# Patient Record
Sex: FEMALE | Race: WHITE | NOT HISPANIC OR LATINO | URBAN - NONMETROPOLITAN AREA
[De-identification: names, ages, dates, MRNs, and addresses within clinical notes are randomized per-mention and may not be internally consistent; named-entity substitution may affect disease eponyms.]

---

## 2019-04-15 ENCOUNTER — OFFICE VISIT (OUTPATIENT)
Dept: INTERNAL MEDICINE | Facility: CLINIC | Age: 19
End: 2019-04-15

## 2019-04-15 VITALS
HEART RATE: 88 BPM | TEMPERATURE: 99 F | OXYGEN SATURATION: 100 % | SYSTOLIC BLOOD PRESSURE: 141 MMHG | RESPIRATION RATE: 18 BRPM | DIASTOLIC BLOOD PRESSURE: 84 MMHG | WEIGHT: 191 LBS

## 2019-04-15 DIAGNOSIS — R53.83 FATIGUE, UNSPECIFIED TYPE: ICD-10-CM

## 2019-04-15 DIAGNOSIS — Z00.00 ANNUAL PHYSICAL EXAM: Primary | ICD-10-CM

## 2019-04-15 DIAGNOSIS — F41.8 DEPRESSION WITH ANXIETY: ICD-10-CM

## 2019-04-15 PROCEDURE — 99385 PREV VISIT NEW AGE 18-39: CPT | Performed by: PHYSICIAN ASSISTANT

## 2019-04-15 RX ORDER — NORGESTIMATE AND ETHINYL ESTRADIOL 7DAYSX3 28
1 KIT ORAL DAILY
COMMUNITY

## 2019-04-15 RX ORDER — FLUOXETINE HYDROCHLORIDE 20 MG/1
20 CAPSULE ORAL DAILY
Qty: 30 CAPSULE | Refills: 5 | Status: SHIPPED | OUTPATIENT
Start: 2019-04-15 | End: 2019-10-18

## 2019-04-15 NOTE — PROGRESS NOTES
Subjective:    Chief Complaint   Patient presents with   • Establish Care         History of Present Illness   Diamante Garay is a 18 y.o. female here for her yearly physical exam. She is a college student and works part time. Not currently exercising. Tries to eat a well balanced diet.    Main concern today is depression and anxiety. She is here today with a friend. Patient states her grandmother passed away a few months ago, and since then she's been tearful most days. She's not wanting to go out and do what she used to. She is more anxious regarding her classes. Not sleeping as well. She does have a good support system. Denies any SI, HI. She complains of feeling tired all of the time, even with a good night's sleep. She denies any past issue with depression. Mother has been diagnosed with bipolar disorder.    She is on an OCP. Last pap in 2018, normal. Periods are regular.    History:  Past Medical History:   Diagnosis Date   • Asthma         History:  LMP: No LMP recorded.  Last pap date: 2018   Abnormal pap? no      Do you take any herbs or supplements that were not prescribed by a doctor? no  Are you taking calcium supplements? N/A  Are you taking aspirin daily? N/A    No Known Allergies    Past Surgical History:   Procedure Laterality Date   • TONSILLECTOMY         Social History     Tobacco Use   • Smoking status: Never Smoker   • Smokeless tobacco: Never Used   Substance Use Topics   • Alcohol use: No     Frequency: Never       Family History   Problem Relation Age of Onset   • Mental illness Mother    • Cancer Maternal Grandmother    • Diabetes Maternal Grandmother    • Hypertension Maternal Grandmother    • Hyperlipidemia Maternal Grandmother    • Cancer Maternal Grandfather    • Heart attack Maternal Grandfather    • Migraines Maternal Grandfather          Review of Systems   Constitutional: Positive for fatigue. Negative for appetite change, chills, fever and unexpected weight change.   HENT: Negative  for congestion, ear pain, hearing loss, nosebleeds, sinus pressure, sore throat, tinnitus and trouble swallowing.    Eyes: Negative for pain, discharge, redness, itching and visual disturbance.   Respiratory: Negative for cough, chest tightness, shortness of breath and wheezing.    Cardiovascular: Negative for chest pain, palpitations and leg swelling.   Gastrointestinal: Negative for abdominal pain, blood in stool, constipation, diarrhea, nausea and vomiting.   Endocrine: Negative for cold intolerance, heat intolerance, polydipsia, polyphagia and polyuria.   Genitourinary: Negative for decreased urine volume, dysuria, flank pain, frequency and hematuria.   Musculoskeletal: Negative for arthralgias, back pain, gait problem, joint swelling, myalgias, neck pain and neck stiffness.   Skin: Negative for color change and rash.   Allergic/Immunologic: Negative for environmental allergies, food allergies and immunocompromised state.   Neurological: Negative for dizziness, syncope, weakness, light-headedness and headaches.   Hematological: Negative for adenopathy. Does not bruise/bleed easily.   Psychiatric/Behavioral: Positive for decreased concentration, dysphoric mood and sleep disturbance. Negative for suicidal ideas. The patient is nervous/anxious.        Do you have pain that bothers you in your daily life? no      Objective:    Vitals:    04/15/19 1559   BP: 141/84   Pulse: 88   Resp: 18   Temp: 99 °F (37.2 °C)   TempSrc: Temporal   SpO2: 100%   Weight: 86.6 kg (191 lb)       Physical Exam   Constitutional: Appears well-developed and well-nourished.   HENT:   Right Ear: Tympanic membrane and external ear normal.   Left Ear: Tympanic membrane and external ear normal.   Nose: Nose normal.   Mouth/Throat: Oropharynx is clear and moist.   Eyes: EOM are normal. Pupils are equal, round, and reactive to light.   Neck: Normal range of motion. Neck supple.   Cardiovascular: Normal rate, regular rhythm and normal heart sounds.     Pulmonary/Chest: Effort normal and breath sounds normal.   Abdominal: Soft. Bowel sounds are normal. There is no CVA tenderness.   Musculoskeletal: Normal range of motion.   Lymphadenopathy: No cervical adenopathy noted.   Neurological: Alert and oriented to person, place, and time.   Skin: Skin is warm and dry.   Psychiatric: Exhibits a normal mood and affect.     Assessment and Plan:     Diamante was seen today for establish care.    Diagnoses and all orders for this visit:    Annual physical exam    Fatigue, unspecified type  -     TSH  -     T4, Free  -     Comprehensive Metabolic Panel  -     CBC No Differential  -     Vitamin B12    Depression with anxiety  -     TSH  -     T4, Free  -     FLUoxetine (PROZAC) 20 MG capsule; Take 1 capsule by mouth Daily.      Discussed risks, benefits, SE of SSRIs.     Patient Counseling:  -Nutrition: Stressed importance of moderation in sodium/caffeine intake, saturated fat and cholesterol, caloric balance, sufficient intake of fresh fruits, vegetables, fiber, calcium, iron, and 1 g folate supplementation if of childbearing age.   -Exercise: Stressed the importance of regular exercise.  -Substance Abuse: Discussed cessation/primary prevention of tobacco (if applicable), alcohol, or other drug use (if applicable); driving or other dangerous activities under the influence; availability of treatment for abuse.   -Injury prevention: Discussed safety belts, safety helmets, smoke detector, smoking near bedding or upholstery.   -Dental health: Discussed importance of regular tooth brushing, flossing, and dental visits.  -Immunizations reviewed.  -Discussed benefits of screening colonoscopy (if applicable).  -After hours service discussed with patient.    Discussed the patient's BMI with her. The BMI is above average; BMI management plan is completed    Follow up in 4 weeks- depression    Chely Rodriguez PA-C  04/15/2019    Please note that portions of this note were completed with chau  voice recognition program. Efforts were made to edit dictation, but occasionally words are mistranscribed.

## 2019-04-20 PROBLEM — F41.8 DEPRESSION WITH ANXIETY: Status: ACTIVE | Noted: 2019-04-20

## 2019-10-18 ENCOUNTER — OFFICE VISIT (OUTPATIENT)
Dept: INTERNAL MEDICINE | Facility: CLINIC | Age: 19
End: 2019-10-18

## 2019-10-18 VITALS
BODY MASS INDEX: 32.49 KG/M2 | WEIGHT: 195 LBS | HEIGHT: 65 IN | SYSTOLIC BLOOD PRESSURE: 135 MMHG | DIASTOLIC BLOOD PRESSURE: 84 MMHG | OXYGEN SATURATION: 99 % | TEMPERATURE: 97.6 F | HEART RATE: 75 BPM

## 2019-10-18 DIAGNOSIS — F41.9 ANXIETY: ICD-10-CM

## 2019-10-18 DIAGNOSIS — Z23 NEED FOR INFLUENZA VACCINATION: ICD-10-CM

## 2019-10-18 DIAGNOSIS — G43.109 MIGRAINE WITH AURA AND WITHOUT STATUS MIGRAINOSUS, NOT INTRACTABLE: Primary | ICD-10-CM

## 2019-10-18 PROCEDURE — 90471 IMMUNIZATION ADMIN: CPT | Performed by: FAMILY MEDICINE

## 2019-10-18 PROCEDURE — 99214 OFFICE O/P EST MOD 30 MIN: CPT | Performed by: FAMILY MEDICINE

## 2019-10-18 PROCEDURE — 90674 CCIIV4 VAC NO PRSV 0.5 ML IM: CPT | Performed by: FAMILY MEDICINE

## 2019-10-18 RX ORDER — PROPRANOLOL HCL 60 MG
60 CAPSULE, EXTENDED RELEASE 24HR ORAL DAILY
Qty: 30 CAPSULE | Refills: 2 | Status: SHIPPED | OUTPATIENT
Start: 2019-10-18 | End: 2020-01-13

## 2019-10-18 NOTE — PROGRESS NOTES
"Diamante Garay is a 19 y.o. female.    Chief Complaint   Patient presents with   • Follow-up     migraines worsened while taking Prozac; patient dc'd medication and symptoms have improved, but are not gone       HPI   Patient presents today to f/u on anxiety and depression.  She reports prozac helped, but did make migraines.  Patient is overwhelmed with stress from work and school.  She admits to nervousness and worry.      She reports migraines with aura as well.  She admits to nausea on occasion.  She does admit to otosensitivity and photosensivity. She has 1-2 migraines a week. She reports she has to take excedrin to get relief.      The following portions of the patient's history were reviewed and updated as appropriate: allergies, current medications, past family history, past medical history, past social history, past surgical history and problem list.     No Known Allergies      Current Outpatient Medications:   •  norgestimate-ethinyl estradiol (TRI-SPRINTEC) 0.18/0.215/0.25 MG-35 MCG per tablet, Take 1 tablet by mouth Daily., Disp: , Rfl:   •  propranolol LA (INDERAL LA) 60 MG 24 hr capsule, Take 1 capsule by mouth Daily., Disp: 30 capsule, Rfl: 2    ROS    Review of Systems   Constitutional: Negative for chills, fatigue and fever.   HENT: Negative for congestion, postnasal drip and sore throat.    Respiratory: Negative for cough and shortness of breath.    Cardiovascular: Negative for chest pain.   Gastrointestinal: Negative for abdominal pain, constipation, diarrhea, nausea and vomiting.   Musculoskeletal: Negative for arthralgias and back pain.   Neurological: Positive for headache. Negative for weakness and numbness.   Psychiatric/Behavioral: Positive for stress. Negative for depressed mood. The patient is nervous/anxious.        Vitals:    10/18/19 0821   BP: 135/84   Pulse: 75   Temp: 97.6 °F (36.4 °C)   SpO2: 99%   Weight: 88.5 kg (195 lb)   Height: 165.1 cm (65\")     Body mass index is 32.45 " kg/m².    Physical Exam     Physical Exam   Constitutional: She is oriented to person, place, and time. She appears well-developed and well-nourished. No distress.   HENT:   Head: Normocephalic and atraumatic.   Right Ear: External ear normal.   Left Ear: External ear normal.   Eyes: Conjunctivae and EOM are normal.   Cardiovascular: Normal rate and regular rhythm.   No murmur heard.  Pulmonary/Chest: Effort normal and breath sounds normal. No respiratory distress. She has no wheezes.   Abdominal: Soft. Bowel sounds are normal. She exhibits no distension. There is no tenderness.   Musculoskeletal: She exhibits no edema.   Neurological: She is alert and oriented to person, place, and time. No cranial nerve deficit.   Skin: Skin is warm and dry.   Psychiatric: She has a normal mood and affect. Her behavior is normal.       Assessment/Plan    Problem List Items Addressed This Visit        Cardiovascular and Mediastinum    Migraine with aura and without status migrainosus, not intractable - Primary     Uncontrolled.  Patient having more than 4 migraines a month.  We will start the patient on propranolol to help for anxiety and migraine prevention.  She may continue taking Excedrin as needed.             Relevant Medications    propranolol LA (INDERAL LA) 60 MG 24 hr capsule       Other    Anxiety     Uncontrolled without medication.  Patient unable to take Prozac.  Will start the patient on propranolol.  Discussed potential side effects with medication.           Other Visit Diagnoses     Need for influenza vaccination        Relevant Orders    Flucelvax Quad=>4Years ([id number]) (Completed)          New Medications Ordered This Visit   Medications   • propranolol LA (INDERAL LA) 60 MG 24 hr capsule     Sig: Take 1 capsule by mouth Daily.     Dispense:  30 capsule     Refill:  2       Orders Placed This Encounter   Procedures   • Flucelvax Quad=>4Years ([id number])       Return in about 1 month (around 11/18/2019) for  anxiety and migraines.    Soraya Mai, DO

## 2019-10-18 NOTE — ASSESSMENT & PLAN NOTE
Uncontrolled.  Patient having more than 4 migraines a month.  We will start the patient on propranolol to help for anxiety and migraine prevention.  She may continue taking Excedrin as needed.

## 2019-10-18 NOTE — ASSESSMENT & PLAN NOTE
Uncontrolled without medication.  Patient unable to take Prozac.  Will start the patient on propranolol.  Discussed potential side effects with medication.

## 2020-01-13 RX ORDER — PROPRANOLOL HCL 60 MG
CAPSULE, EXTENDED RELEASE 24HR ORAL
Qty: 90 CAPSULE | Refills: 0 | Status: SHIPPED | OUTPATIENT
Start: 2020-01-13 | End: 2020-04-07

## 2020-04-07 RX ORDER — PROPRANOLOL HCL 60 MG
CAPSULE, EXTENDED RELEASE 24HR ORAL
Qty: 90 CAPSULE | Refills: 0 | Status: SHIPPED | OUTPATIENT
Start: 2020-04-07 | End: 2020-07-02

## 2020-07-02 RX ORDER — PROPRANOLOL HCL 60 MG
CAPSULE, EXTENDED RELEASE 24HR ORAL
Qty: 30 CAPSULE | Refills: 0 | Status: SHIPPED | OUTPATIENT
Start: 2020-07-02 | End: 2020-07-31

## 2020-07-31 RX ORDER — PROPRANOLOL HCL 60 MG
CAPSULE, EXTENDED RELEASE 24HR ORAL
Qty: 30 CAPSULE | Refills: 0 | Status: SHIPPED | OUTPATIENT
Start: 2020-07-31 | End: 2020-08-24

## 2020-08-24 RX ORDER — PROPRANOLOL HCL 60 MG
CAPSULE, EXTENDED RELEASE 24HR ORAL
Qty: 30 CAPSULE | Refills: 0 | Status: SHIPPED | OUTPATIENT
Start: 2020-08-24 | End: 2020-09-19

## 2020-09-19 RX ORDER — PROPRANOLOL HCL 60 MG
CAPSULE, EXTENDED RELEASE 24HR ORAL
Qty: 30 CAPSULE | Refills: 0 | Status: SHIPPED | OUTPATIENT
Start: 2020-09-19 | End: 2020-09-25 | Stop reason: DRUGHIGH

## 2020-09-25 ENCOUNTER — OFFICE VISIT (OUTPATIENT)
Dept: INTERNAL MEDICINE | Facility: CLINIC | Age: 20
End: 2020-09-25

## 2020-09-25 VITALS
DIASTOLIC BLOOD PRESSURE: 82 MMHG | TEMPERATURE: 98 F | HEIGHT: 65 IN | HEART RATE: 98 BPM | BODY MASS INDEX: 36.79 KG/M2 | WEIGHT: 220.8 LBS | OXYGEN SATURATION: 98 % | SYSTOLIC BLOOD PRESSURE: 130 MMHG

## 2020-09-25 DIAGNOSIS — G43.109 MIGRAINE WITH AURA AND WITHOUT STATUS MIGRAINOSUS, NOT INTRACTABLE: Primary | ICD-10-CM

## 2020-09-25 DIAGNOSIS — F32.0 CURRENT MILD EPISODE OF MAJOR DEPRESSIVE DISORDER WITHOUT PRIOR EPISODE (HCC): ICD-10-CM

## 2020-09-25 DIAGNOSIS — F41.9 ANXIETY: ICD-10-CM

## 2020-09-25 PROCEDURE — 99214 OFFICE O/P EST MOD 30 MIN: CPT | Performed by: FAMILY MEDICINE

## 2020-09-25 RX ORDER — PROPRANOLOL HYDROCHLORIDE 80 MG/1
80 CAPSULE, EXTENDED RELEASE ORAL DAILY
Qty: 30 CAPSULE | Refills: 5 | Status: SHIPPED | OUTPATIENT
Start: 2020-09-25 | End: 2021-01-20

## 2020-09-25 RX ORDER — BUPROPION HYDROCHLORIDE 150 MG/1
150 TABLET ORAL DAILY
Qty: 30 TABLET | Refills: 5 | Status: SHIPPED | OUTPATIENT
Start: 2020-09-25 | End: 2021-03-10

## 2020-09-25 NOTE — PROGRESS NOTES
Diamante Garay is a 19 y.o. female.    Chief Complaint   Patient presents with   • Depression     states she has been having issues for several months but feels like it's gotten worse. Would like to discuss Wellbutrin       HPI   Patient presents today with worsening anxiety and now symptoms of depression.  She cannot shut her mind off.  She reports nervousness, worry, difficulty sleeping.  Denies any feelings of hopelessness or worthlessness.  She does feel down and has crying spells.  She reports increased stress with school and work.  She has been taking propranolol for migraines and anxiety.  However, migraines have become more frequent as well.  Migraines are 2 times a week. She is sensitive to light and sound.  Not much nausea.  She takes excedrin with good response.  She has gained weight as well.      The following portions of the patient's history were reviewed and updated as appropriate: allergies, current medications, past family history, past medical history, past social history, past surgical history and problem list.     No Known Allergies      Current Outpatient Medications:   •  norgestimate-ethinyl estradiol (TRI-SPRINTEC) 0.18/0.215/0.25 MG-35 MCG per tablet, Take 1 tablet by mouth Daily., Disp: , Rfl:   •  buPROPion XL (Wellbutrin XL) 150 MG 24 hr tablet, Take 1 tablet by mouth Daily., Disp: 30 tablet, Rfl: 5  •  propranolol LA (Inderal LA) 80 MG 24 hr capsule, Take 1 capsule by mouth Daily., Disp: 30 capsule, Rfl: 5    ROS    Review of Systems   Constitutional: Positive for fatigue. Negative for chills and fever.   HENT: Negative for congestion, postnasal drip and rhinorrhea.    Eyes: Negative for visual disturbance.   Respiratory: Negative for cough, shortness of breath and wheezing.    Cardiovascular: Negative for chest pain and leg swelling.   Gastrointestinal: Positive for diarrhea. Negative for abdominal pain, constipation, nausea and vomiting.   Allergic/Immunologic: Negative for  "environmental allergies.   Neurological: Positive for headache. Negative for weakness and numbness.   Hematological: Does not bruise/bleed easily.   Psychiatric/Behavioral: Positive for sleep disturbance and depressed mood. The patient is nervous/anxious.        Vitals:    09/25/20 1634   BP: 130/82   Pulse: 98   Temp: 98 °F (36.7 °C)   SpO2: 98%   Weight: 100 kg (220 lb 12.8 oz)   Height: 165.1 cm (65\")     Body mass index is 36.74 kg/m².    Physical Exam     Physical Exam  Constitutional:       General: She is not in acute distress.     Appearance: She is well-developed.   HENT:      Head: Normocephalic and atraumatic.      Right Ear: External ear normal.      Left Ear: External ear normal.   Eyes:      Extraocular Movements: Extraocular movements intact.      Conjunctiva/sclera: Conjunctivae normal.   Neck:      Musculoskeletal: Normal range of motion and neck supple.   Cardiovascular:      Rate and Rhythm: Normal rate and regular rhythm.      Heart sounds: No murmur.   Pulmonary:      Effort: Pulmonary effort is normal. No respiratory distress.      Breath sounds: Normal breath sounds. No wheezing.   Abdominal:      General: Bowel sounds are normal. There is no distension.      Palpations: Abdomen is soft.      Tenderness: There is no abdominal tenderness.   Lymphadenopathy:      Cervical: No cervical adenopathy.   Skin:     General: Skin is warm and dry.   Neurological:      Mental Status: She is alert and oriented to person, place, and time.      Cranial Nerves: No cranial nerve deficit.   Psychiatric:         Attention and Perception: Attention normal.         Mood and Affect: Mood is anxious and depressed.         Behavior: Behavior normal.         Assessment/Plan    Problem List Items Addressed This Visit        Cardiovascular and Mediastinum    Migraine with aura and without status migrainosus, not intractable - Primary     Uncontrolled.  Will increase propranolol to 80mg daily.            Relevant " Medications    buPROPion XL (Wellbutrin XL) 150 MG 24 hr tablet    propranolol LA (Inderal LA) 80 MG 24 hr capsule       Other    Anxiety     Uncontrolled.  Will increase propranolol and start wellbutrin.           Current mild episode of major depressive disorder without prior episode (CMS/HCC)     Uncontrolled.  Will start wellbutrin.  Discussed potential side effects of the medication, including weight loss.          Relevant Medications    buPROPion XL (Wellbutrin XL) 150 MG 24 hr tablet          New Medications Ordered This Visit   Medications   • buPROPion XL (Wellbutrin XL) 150 MG 24 hr tablet     Sig: Take 1 tablet by mouth Daily.     Dispense:  30 tablet     Refill:  5   • propranolol LA (Inderal LA) 80 MG 24 hr capsule     Sig: Take 1 capsule by mouth Daily.     Dispense:  30 capsule     Refill:  5       No orders of the defined types were placed in this encounter.      Return in about 6 weeks (around 11/6/2020) for anxiety and depression.    Soraya Mai, DO

## 2020-09-25 NOTE — ASSESSMENT & PLAN NOTE
Uncontrolled.  Will start wellbutrin.  Discussed potential side effects of the medication, including weight loss.

## 2020-10-13 RX ORDER — PROPRANOLOL HCL 60 MG
CAPSULE, EXTENDED RELEASE 24HR ORAL
Qty: 30 CAPSULE | Refills: 0 | OUTPATIENT
Start: 2020-10-13

## 2020-11-20 PROCEDURE — U0004 COV-19 TEST NON-CDC HGH THRU: HCPCS | Performed by: NURSE PRACTITIONER

## 2021-01-20 RX ORDER — PROPRANOLOL HYDROCHLORIDE 80 MG/1
CAPSULE, EXTENDED RELEASE ORAL
Qty: 90 CAPSULE | Refills: 3 | Status: SHIPPED | OUTPATIENT
Start: 2021-01-20 | End: 2022-05-05 | Stop reason: SDUPTHER

## 2021-03-10 RX ORDER — BUPROPION HYDROCHLORIDE 150 MG/1
TABLET ORAL
Qty: 30 TABLET | Refills: 5 | Status: SHIPPED | OUTPATIENT
Start: 2021-03-10 | End: 2022-05-05

## 2021-03-10 RX ORDER — PROPRANOLOL HCL 60 MG
CAPSULE, EXTENDED RELEASE 24HR ORAL
Qty: 30 CAPSULE | Refills: 0 | OUTPATIENT
Start: 2021-03-10

## 2022-05-05 ENCOUNTER — TELEMEDICINE (OUTPATIENT)
Dept: INTERNAL MEDICINE | Facility: CLINIC | Age: 22
End: 2022-05-05

## 2022-05-05 DIAGNOSIS — F41.9 ANXIETY: Primary | ICD-10-CM

## 2022-05-05 DIAGNOSIS — G43.109 MIGRAINE WITH AURA AND WITHOUT STATUS MIGRAINOSUS, NOT INTRACTABLE: ICD-10-CM

## 2022-05-05 DIAGNOSIS — F32.0 CURRENT MILD EPISODE OF MAJOR DEPRESSIVE DISORDER WITHOUT PRIOR EPISODE: ICD-10-CM

## 2022-05-05 PROCEDURE — 99214 OFFICE O/P EST MOD 30 MIN: CPT | Performed by: FAMILY MEDICINE

## 2022-05-05 RX ORDER — PROPRANOLOL HYDROCHLORIDE 80 MG/1
80 CAPSULE, EXTENDED RELEASE ORAL DAILY
Qty: 90 CAPSULE | Refills: 3 | Status: SHIPPED | OUTPATIENT
Start: 2022-05-05

## 2022-05-05 NOTE — PROGRESS NOTES
Diamante Garay is a 21 y.o. female.    Chief Complaint   Patient presents with   • Anxiety     During today's visit, I reviewed the documented allergies, medications, chief complaint, and pertinent vitals.  I have confirmed with the patient that there have been no changes since this information was discussed with my clinical team member.    HPI   Patient presents today via video visit to follow up on anxiety and depression.  Patient relocated to [facility].  Anxiety has been getting better.  She is taking propranolol regularly.  No longer taking wellbutrin.  Denies much nervousness or worry.  Occasional anxiety with certain situations.  Denies hopelessness, worthlessness, sadness.    Patient has migraines as well.  She rarely gets them and can go months without a migraine.  Taking propranolol for migraine prevention as well.  Had migraine last week and took Excedrin with good response. .  Admits to aura and sensitive to light and sound.    The following portions of the patient's history were reviewed and updated as appropriate: allergies, current medications, past family history, past medical history, past social history, past surgical history and problem list.     No Known Allergies      Current Outpatient Medications:   •  norgestimate-ethinyl estradiol (ORTHO TRI-CYCLEN,TRINESSA) 0.18/0.215/0.25 MG-35 MCG per tablet, Take 1 tablet by mouth Daily., Disp: , Rfl:   •  propranolol LA (INDERAL LA) 80 MG 24 hr capsule, Take 1 capsule by mouth Daily., Disp: 90 capsule, Rfl: 3    ROS    Review of Systems   Constitutional: Negative for chills and fever.   Respiratory: Negative for cough and shortness of breath.    Cardiovascular: Negative for chest pain.   Neurological: Positive for headache (rare).   Psychiatric/Behavioral: Negative for depressed mood. The patient is not nervous/anxious.        There were no vitals filed for this visit.  There is no height or weight on file to calculate BMI.    Physical Exam     Physical  Exam  Constitutional:       General: She is not in acute distress.     Appearance: Normal appearance. She is well-developed.   HENT:      Head: Normocephalic and atraumatic.   Eyes:      Conjunctiva/sclera: Conjunctivae normal.   Pulmonary:      Effort: Pulmonary effort is normal. No respiratory distress.   Skin:     Coloration: Skin is not pale.   Neurological:      General: No focal deficit present.      Mental Status: She is alert and oriented to person, place, and time.   Psychiatric:         Mood and Affect: Mood normal.         Behavior: Behavior normal.         Assessment/Plan    Problems Addressed this Visit     Anxiety - Primary    Migraine with aura and without status migrainosus, not intractable    Relevant Medications    propranolol LA (INDERAL LA) 80 MG 24 hr capsule    Current mild episode of major depressive disorder without prior episode (HCC)        Depression controlled without medication.  Anxiety stable on propranolol.  Migraines also stable with propranolol.  Continue current medication.  Continue OTC medications as needed for quick relief of migraines.  Discussed clinics in [facility] she may want to consider for PCP closer to home as she would like to establish care with a local provider.      New Medications Ordered This Visit   Medications   • propranolol LA (INDERAL LA) 80 MG 24 hr capsule     Sig: Take 1 capsule by mouth Daily.     Dispense:  90 capsule     Refill:  3       No orders of the defined types were placed in this encounter.      No follow-ups on file.    Soraya Mai DO

## 2023-05-26 RX ORDER — PROPRANOLOL HYDROCHLORIDE 80 MG/1
80 CAPSULE, EXTENDED RELEASE ORAL DAILY
Qty: 90 CAPSULE | Refills: 1 | Status: SHIPPED | OUTPATIENT
Start: 2023-05-26